# Patient Record
Sex: MALE | Race: WHITE | NOT HISPANIC OR LATINO | Employment: OTHER | URBAN - METROPOLITAN AREA
[De-identification: names, ages, dates, MRNs, and addresses within clinical notes are randomized per-mention and may not be internally consistent; named-entity substitution may affect disease eponyms.]

---

## 2020-05-14 ENCOUNTER — TELEPHONE (OUTPATIENT)
Dept: ONCOLOGY | Facility: CLINIC | Age: 30
End: 2020-05-14

## 2020-05-15 ENCOUNTER — LAB (OUTPATIENT)
Dept: LAB | Facility: HOSPITAL | Age: 30
End: 2020-05-15

## 2020-05-15 ENCOUNTER — CONSULT (OUTPATIENT)
Dept: ONCOLOGY | Facility: CLINIC | Age: 30
End: 2020-05-15

## 2020-05-15 VITALS
WEIGHT: 315 LBS | BODY MASS INDEX: 41.75 KG/M2 | SYSTOLIC BLOOD PRESSURE: 195 MMHG | TEMPERATURE: 96 F | HEIGHT: 73 IN | RESPIRATION RATE: 18 BRPM | HEART RATE: 100 BPM | OXYGEN SATURATION: 93 % | DIASTOLIC BLOOD PRESSURE: 99 MMHG

## 2020-05-15 DIAGNOSIS — D72.9 NEUTROPHILIC LEUKOCYTOSIS: ICD-10-CM

## 2020-05-15 DIAGNOSIS — D72.9 NEUTROPHILIC LEUKOCYTOSIS: Primary | ICD-10-CM

## 2020-05-15 DIAGNOSIS — E66.01 MORBIDLY OBESE (HCC): ICD-10-CM

## 2020-05-15 LAB
ERYTHROCYTE [DISTWIDTH] IN BLOOD BY AUTOMATED COUNT: 13.6 % (ref 12.3–15.4)
HCT VFR BLD AUTO: 47.4 % (ref 37.5–51)
HGB BLD-MCNC: 15.7 G/DL (ref 13–17.7)
LYMPHOCYTES # BLD AUTO: 3.7 10*3/MM3 (ref 0.7–3.1)
LYMPHOCYTES NFR BLD AUTO: 29.9 % (ref 19.6–45.3)
MCH RBC QN AUTO: 31.2 PG (ref 26.6–33)
MCHC RBC AUTO-ENTMCNC: 33.1 G/DL (ref 31.5–35.7)
MCV RBC AUTO: 94.4 FL (ref 79–97)
MONOCYTES # BLD AUTO: 0.6 10*3/MM3 (ref 0.1–0.9)
MONOCYTES NFR BLD AUTO: 5.1 % (ref 5–12)
NEUTROPHILS # BLD AUTO: 8.1 10*3/MM3 (ref 1.7–7)
NEUTROPHILS NFR BLD AUTO: 65 % (ref 42.7–76)
PLATELET # BLD AUTO: 293 10*3/MM3 (ref 140–450)
PMV BLD AUTO: 8.4 FL (ref 6–12)
RBC # BLD AUTO: 5.03 10*6/MM3 (ref 4.14–5.8)
WBC NRBC COR # BLD: 12.4 10*3/MM3 (ref 3.4–10.8)

## 2020-05-15 PROCEDURE — 36415 COLL VENOUS BLD VENIPUNCTURE: CPT

## 2020-05-15 PROCEDURE — 99213 OFFICE O/P EST LOW 20 MIN: CPT | Performed by: INTERNAL MEDICINE

## 2020-05-15 PROCEDURE — 85025 COMPLETE CBC W/AUTO DIFF WBC: CPT

## 2020-05-15 RX ORDER — PREDNISONE 20 MG/1
TABLET ORAL
COMMUNITY
Start: 2020-04-15

## 2020-05-15 NOTE — PROGRESS NOTES
ID: 29 y.o. year old male from [place] [id number]    PCP: Ar Ramirez MD    REFERRING PHYSICIAN: Ar Ramirez MD    Reason for Consultation: Neutrophilic Leukocytosis    Dear Dr. Ramirez    It is a pleasure to meet Mr. Farris today.  He is a very pleasant 29-year-old gentleman who presents today for consultation due to severe neutrophilic leukocytosis.  He reports that at the time of the check he was having an acute gout attack.  Obviously the suspicion of leukemia is there when you have gout.  Patient is morbidly obese.  He also smokes a fair bit.      Past Medical History:   Diagnosis Date   • Bowel trouble    • Gout    • Seizure (CMS/HCC)        Past Surgical History:   Procedure Laterality Date   • CHOLECYSTECTOMY     • FOOT SURGERY Left     Age 11   • HAND SURGERY Left     fracture age 13       Social History     Socioeconomic History   • Marital status: Single     Spouse name: Not on file   • Number of children: Not on file   • Years of education: Not on file   • Highest education level: Not on file   Tobacco Use   • Smoking status: Current Every Day Smoker     Packs/day: 1.00     Years: 23.00     Pack years: 23.00     Types: Cigarettes   • Smokeless tobacco: Never Used   Substance and Sexual Activity   • Alcohol use: Never     Frequency: Never   • Drug use: Never       Family History   Problem Relation Age of Onset   • Diabetes Mother    • Cancer Father    • Gout Father    • Breast cancer Maternal Grandmother        Review of Systems:    16 point review of systems was performed and reviewed and scanned into the EMR    Review of Systems - Oncology      Current Outpatient Medications:   •  predniSONE (DELTASONE) 20 MG tablet, TK 2 TS PO D, Disp: , Rfl:     Pain Medications             predniSONE (DELTASONE) 20 MG tablet TK 2 TS PO D           No Known Allergies    ECOG SCORE: 0    Objective     Vitals:    05/15/20 1420   BP: (!) 195/99   Pulse: 100   Resp: 18   Temp: 96 °F (35.6 °C)   SpO2: 93%      Body mass index is 49.74 kg/m².  Body surface area is 2.82 meters squared.        05/15/20  1420   Weight: (!) 171 kg (377 lb)     Pain Score    05/15/20 1420   PainSc:   8          Physical Exam    General: well appearing, in no acute distress, morbid obesity  HEENT: sclera anicteric, oropharynx clear, neck is supple  Lymphatics: no cervical, supraclavicular, or axillary adenopathy  Cardiovascular: regular rate and rhythm, no murmurs, rubs or gallops  Lungs: clear to auscultation bilaterally  Abdomen: soft, nontender, nondistended.  No palpable organomegaly  Extremities: no lower extremity edema  Skin: no rashes, lesions, bruising, or petechiae  Msk:  Shows no weakness of the large muscle groups  Psych: Mood is stable      Lab Results   Component Value Date    HGB 15.7 05/15/2020    HCT 47.4 05/15/2020    MCV 94.4 05/15/2020     05/15/2020    WBC 12.40 (H) 05/15/2020    NEUTROABS 8.10 (H) 05/15/2020    LYMPHSABS 3.70 (H) 05/15/2020    MONOSABS 0.60 05/15/2020             Assessment/Plan      1.  Mild neutrophilic leukocytosis likely secondary to obesity and smoking.  His higher numbers are taken at his PCPs office likely is acute reaction to his gout flare.  If this was leukemia it would have gotten worse.  Recommend rechecking him in 4 months.  Otherwise no other intervention.  He does need smoking cessation and rate control.  His inflammatory markers are likely elevated due to this.    I spent a total of 30 minutes in direct patient care, greater than 20 minutes (greater than 50%) were spent in coordination of care, and counseling the patient regarding neutrophilic leukocytosis. Answered any questions patient had with the plan.      Thank you for allowing me to participate in the care of this patient.    Yours sincerely,    Adarsh Marques MD  [facility]  Hematology and Oncology         Orders Placed This Encounter   Procedures   • CBC & Differential     Standing Status:   Future      Number of Occurrences:   1     Standing Expiration Date:   5/15/2021     Order Specific Question:   Manual Differential     Answer:   No